# Patient Record
Sex: MALE | Race: WHITE | NOT HISPANIC OR LATINO | URBAN - METROPOLITAN AREA
[De-identification: names, ages, dates, MRNs, and addresses within clinical notes are randomized per-mention and may not be internally consistent; named-entity substitution may affect disease eponyms.]

---

## 2020-12-11 ENCOUNTER — HOSPITAL ENCOUNTER (EMERGENCY)
Facility: HOSPITAL | Age: 40
Discharge: HOME | End: 2020-12-11
Attending: EMERGENCY MEDICINE

## 2020-12-11 VITALS
DIASTOLIC BLOOD PRESSURE: 92 MMHG | SYSTOLIC BLOOD PRESSURE: 141 MMHG | TEMPERATURE: 98.6 F | WEIGHT: 175 LBS | BODY MASS INDEX: 25.05 KG/M2 | HEART RATE: 76 BPM | HEIGHT: 70 IN | RESPIRATION RATE: 18 BRPM | OXYGEN SATURATION: 100 %

## 2020-12-11 DIAGNOSIS — R20.2 PARESTHESIA OF UPPER AND LOWER EXTREMITIES OF BOTH SIDES: ICD-10-CM

## 2020-12-11 DIAGNOSIS — R42 INTERMITTENT LIGHTHEADEDNESS: Primary | ICD-10-CM

## 2020-12-11 LAB
ALBUMIN SERPL BCP-MCNC: 3.9 G/DL (ref 3.5–5)
ALP SERPL-CCNC: 93 U/L (ref 46–116)
ALT SERPL W P-5'-P-CCNC: 23 U/L (ref 12–78)
ANION GAP SERPL CALCULATED.3IONS-SCNC: 4 MMOL/L (ref 4–13)
AST SERPL W P-5'-P-CCNC: 14 U/L (ref 5–45)
ATRIAL RATE: 66 BPM
BILIRUB SERPL-MCNC: 0.18 MG/DL (ref 0.2–1)
BUN SERPL-MCNC: 17 MG/DL (ref 5–25)
CALCIUM SERPL-MCNC: 9.3 MG/DL (ref 8.3–10.1)
CHLORIDE SERPL-SCNC: 105 MMOL/L (ref 100–108)
CO2 SERPL-SCNC: 28 MMOL/L (ref 21–32)
CREAT SERPL-MCNC: 1.07 MG/DL (ref 0.6–1.3)
GFR SERPL CREATININE-BSD FRML MDRD: 86 ML/MIN/1.73SQ M
GLUCOSE SERPL-MCNC: 86 MG/DL (ref 65–140)
P AXIS: 20 DEGREES
POTASSIUM SERPL-SCNC: 4.2 MMOL/L (ref 3.5–5.3)
PR INTERVAL: 124 MS
PROT SERPL-MCNC: 7.5 G/DL (ref 6.4–8.2)
QRS AXIS: 86 DEGREES
QRSD INTERVAL: 98 MS
QT INTERVAL: 388 MS
QTC INTERVAL: 406 MS
SODIUM SERPL-SCNC: 137 MMOL/L (ref 136–145)
T WAVE AXIS: 24 DEGREES
VENTRICULAR RATE: 66 BPM

## 2020-12-11 PROCEDURE — 99285 EMERGENCY DEPT VISIT HI MDM: CPT | Performed by: EMERGENCY MEDICINE

## 2020-12-11 PROCEDURE — 93005 ELECTROCARDIOGRAM TRACING: CPT

## 2020-12-11 PROCEDURE — 80053 COMPREHEN METABOLIC PANEL: CPT | Performed by: EMERGENCY MEDICINE

## 2020-12-11 PROCEDURE — 99285 EMERGENCY DEPT VISIT HI MDM: CPT

## 2020-12-11 PROCEDURE — 93010 ELECTROCARDIOGRAM REPORT: CPT | Performed by: INTERNAL MEDICINE

## 2020-12-11 PROCEDURE — 36415 COLL VENOUS BLD VENIPUNCTURE: CPT | Performed by: EMERGENCY MEDICINE

## 2021-08-22 ENCOUNTER — HOSPITAL ENCOUNTER (EMERGENCY)
Facility: HOSPITAL | Age: 41
Discharge: HOME | End: 2021-08-23
Attending: EMERGENCY MEDICINE | Admitting: EMERGENCY MEDICINE
Payer: COMMERCIAL

## 2021-08-22 ENCOUNTER — APPOINTMENT (EMERGENCY)
Dept: RADIOLOGY | Facility: HOSPITAL | Age: 41
End: 2021-08-22
Payer: COMMERCIAL

## 2021-08-22 VITALS
SYSTOLIC BLOOD PRESSURE: 117 MMHG | TEMPERATURE: 98 F | OXYGEN SATURATION: 99 % | RESPIRATION RATE: 18 BRPM | HEART RATE: 96 BPM | HEIGHT: 70 IN | DIASTOLIC BLOOD PRESSURE: 91 MMHG | WEIGHT: 175 LBS | BODY MASS INDEX: 25.05 KG/M2

## 2021-08-22 DIAGNOSIS — L03.115 CELLULITIS OF RIGHT FOOT: Primary | ICD-10-CM

## 2021-08-22 PROCEDURE — 99283 EMERGENCY DEPT VISIT LOW MDM: CPT

## 2021-08-22 PROCEDURE — 73630 X-RAY EXAM OF FOOT: CPT

## 2021-08-22 PROCEDURE — 99284 EMERGENCY DEPT VISIT MOD MDM: CPT | Performed by: EMERGENCY MEDICINE

## 2021-08-22 RX ORDER — CEPHALEXIN 500 MG/1
500 CAPSULE ORAL EVERY 6 HOURS SCHEDULED
Qty: 20 CAPSULE | Refills: 0 | Status: SHIPPED | OUTPATIENT
Start: 2021-08-22 | End: 2021-08-27

## 2021-08-22 RX ORDER — CEPHALEXIN 250 MG/1
500 CAPSULE ORAL ONCE
Status: COMPLETED | OUTPATIENT
Start: 2021-08-23 | End: 2021-08-22

## 2021-08-22 RX ORDER — IBUPROFEN 600 MG/1
600 TABLET ORAL ONCE
Status: COMPLETED | OUTPATIENT
Start: 2021-08-22 | End: 2021-08-22

## 2021-08-22 RX ADMIN — IBUPROFEN 600 MG: 600 TABLET, FILM COATED ORAL at 23:04

## 2021-08-22 RX ADMIN — CEPHALEXIN 500 MG: 250 CAPSULE ORAL at 23:56

## 2021-08-22 NOTE — Clinical Note
Kylee Colvin was seen and treated in our emergency department on 8/22/2021  Weight bearing as tolerated    Diagnosis: Right foot cellulitis    Anitha Bergeron  is off the rest of the shift today, may return to work on return date  He may return on this date: 08/24/2021         If you have any questions or concerns, please don't hesitate to call        Martha Mak MD    ______________________________           _______________          _______________  [facility] [place]                              Date                                Time

## 2021-08-23 NOTE — ED PROVIDER NOTES
History  Chief Complaint   Patient presents with   [facility] Injury     Patient states he stepped on something with right foot on Thursday  Redness, swelling and puss-like drainage from the site  Increasing pain in right foot radiating up right leg  Denies any fevers/chills  37 y/o male presents to the ED for evaluation of right foot injury sustained 3 days ago  He states that he sustained an area of redness and irritation to the side of his foot and suspected that he either stepped on something or a rock was in his shoe  He noticed progressive redness, swelling, pain at this site and attempted to drain it using a "hot pin" last night  He is able to express purulent fluid, however today he has noticed increasing pain radiating up the right leg  No fevers, chills, cough, shortness of breath, chest pain, abdominal pain, nausea, vomiting, diarrhea, or urinary symptoms  Tetanus is up-to-date  None       History reviewed  No pertinent past medical history  History reviewed  No pertinent surgical history  History reviewed  No pertinent family history  I have reviewed and agree with the history as documented  E-Cigarette/Vaping    E-Cigarette Use Never User      E-Cigarette/Vaping Substances     Social History     Tobacco Use    Smoking status: Current Every Day Smoker     Packs/day: 1 00    Smokeless tobacco: Never Used   Vaping Use    Vaping Use: Never used   Substance Use Topics    Alcohol use: Yes     Comment: rare    Drug use: Never        Review of Systems   Constitutional: Negative for chills and fever  HENT: Negative for congestion, rhinorrhea and sore throat  Respiratory: Negative for cough and shortness of breath  Cardiovascular: Negative for chest pain and palpitations  Gastrointestinal: Negative for abdominal pain, diarrhea, nausea and vomiting  Genitourinary: Negative for dysuria and hematuria  Musculoskeletal: Negative for back pain and neck pain          Pain and swelling of the right foot   Skin:        Redness of the right foot   Neurological: Negative for weakness, light-headedness, numbness and headaches  All other systems reviewed and are negative  Physical Exam  ED Triage Vitals [08/22/21 2122]   Temperature Pulse Respirations Blood Pressure SpO2   98 °F (36 7 °C) 96 18 117/91 99 %      Temp Source Heart Rate Source Patient Position - Orthostatic VS BP Location FiO2 (%)   Oral Monitor Sitting Left arm --      Pain Score       6             Orthostatic Vital Signs  Vitals:    08/22/21 2122   BP: 117/91   Pulse: 96   Patient Position - Orthostatic VS: Sitting       Physical Exam  Vitals and nursing note reviewed  Constitutional:       General: He is not in acute distress  Appearance: Normal appearance  He is normal weight  HENT:      Head: Normocephalic and atraumatic  Right Ear: External ear normal       Left Ear: External ear normal       Nose: Nose normal       Mouth/Throat:      Mouth: Mucous membranes are moist       Pharynx: Oropharynx is clear  No oropharyngeal exudate or posterior oropharyngeal erythema  Eyes:      Extraocular Movements: Extraocular movements intact  Conjunctiva/sclera: Conjunctivae normal       Pupils: Pupils are equal, round, and reactive to light  Cardiovascular:      Rate and Rhythm: Normal rate and regular rhythm  Pulses: Normal pulses  Heart sounds: Normal heart sounds  Pulmonary:      Effort: Pulmonary effort is normal  No respiratory distress  Breath sounds: Normal breath sounds  No wheezing or rales  Abdominal:      General: Abdomen is flat  Bowel sounds are normal  There is no distension  Palpations: Abdomen is soft  Tenderness: There is no abdominal tenderness  There is no right CVA tenderness, left CVA tenderness or guarding  Musculoskeletal:         General: No swelling or tenderness  Normal range of motion  Cervical back: Normal range of motion and neck supple   No tenderness  Comments: There is a small area of callus on the right distal medial plantar aspect of the right foot with cerumen the erythema and warmth  No fluctuance or purulent material expressed from the site  Mild tenderness to palpation  Neurovascularly intact  Skin:     General: Skin is warm and dry  Comments: Erythema as noted above   Neurological:      General: No focal deficit present  Mental Status: He is alert and oriented to person, place, and time  Sensory: No sensory deficit  Motor: No weakness  ED Medications  Medications   ibuprofen (MOTRIN) tablet 600 mg (600 mg Oral Given 8/22/21 2304)   cephalexin (KEFLEX) capsule 500 mg (500 mg Oral Given 8/22/21 2356)       Diagnostic Studies  Results Reviewed     None                 XR foot 3+ views RIGHT   ED Interpretation by Errol Edward MD (08/22 2344)   No radiopaque foreign body  No acute osseous abnormality  Final Result by Anthony Lilly MD (08/23 1037)      No acute osseous abnormality  Unremarkable soft tissues  Workstation performed: [id number]               Procedures  Procedures      ED Course                                       MDM  Number of Diagnoses or Management Options  Cellulitis of right foot  Diagnosis management comments: 35 y/o male presents to the ED for evaluation of right foot injury sustained 3 days ago  He states that he sustained an area of redness and irritation to the side of his foot and suspected that he either stepped on something or a rock was in his shoe  He noticed progressive redness, swelling, pain at this site and attempted to drain it using a "hot pin" last night  He is able to express purulent fluid, however today he has noticed increasing pain radiating up the right leg  No fevers, chills, cough, shortness of breath, chest pain, abdominal pain, nausea, vomiting, diarrhea, or urinary symptoms  Tetanus is up-to-date      Afebrile, VSS, appears slightly uncomfortable but no acute distress  Heart regular rate rhythm, lungs clear to auscultation bilaterally, abdomen benign  There is a small area of callus on the right distal medial plantar aspect of the right foot with cerumen the erythema and warmth  No fluctuance or purulent material expressed from the site  Likely cellulitis, will treat with Keflex  Due to concern for foreign body, will obtain x-rays of the right foot  X-rays reveal no foreign body  Discussed all findings, treatment, red flags/return precautions, and outpatient follow-up and the patient/family understands and agrees  Stable for discharge  Disposition  Final diagnoses:   Cellulitis of right foot     Time reflects when diagnosis was documented in both MDM as applicable and the Disposition within this note     Time User Action Codes Description Comment    8/22/2021 11:44 PM Yoandy Walker [[id number]] Cellulitis of right foot       ED Disposition     ED Disposition Condition Date/Time Comment    Discharge Stable Sun Aug 22, 2021 11:44 PM Bryce Stevenson discharge to home/self care              Follow-up Information     Follow up With Specialties Details Why Contact Info Additional [address] [facility] DO [place] [facility] Call in 1 day As needed for follow-up [address] [place] [address] [facility] Pod[facility]ry Call in 1 day As needed for follow-up [address] [id number]  [address] [facility], [address] [place], [place], [address] [facility]    [address] [place] [address] 34 [facility] Emergency Department Emergency Medicine Go to  If symptoms worsen [address]  [address] [facility] 64 [facility] Emergency Department, [address], [address]   [phone]          Discharge Medication List as of 8/22/2021 11:54 PM      START taking these medications    Details   cephalexin (KEFLEX) 500 mg capsule Take 1 capsule (500 mg total) by mouth every 6 (six) hours for 5 days, Starting Sun 8/22/2021, Until Fri 8/27/2021, Normal           No discharge procedures on file  PDMP Review     None           ED Provider  Attending physically available and evaluated Andrew Escobar I managed the patient along with the ED Attending      Electronically Signed by         Darius Baum MD  09/03/21 9716

## 2021-08-23 NOTE — ED ATTENDING ATTESTATION
8/22/2021  I, Antonino Enrique MD, saw and evaluated the patient  I have discussed the patient with the resident/non-physician practitioner and agree with the resident's/non-physician practitioner's findings, Plan of Care, and MDM as documented in the resident's/non-physician practitioner's note, except where noted  All available labs and Radiology studies were reviewed  I was present for key portions of any procedure(s) performed by the resident/non-physician practitioner and I was immediately available to provide assistance  At this point I agree with the current assessment done in the Emergency Department  I have conducted an independent evaluation of this patient a history and physical is as follows:    ED Course   36 M with right foot pain onset of sx was Thursday - unclear if patint stepped on something  But developed pain redness and swelling to lateral aspect of right foot  Patinet attempted to express purulent material  With some mild relief of sx but redness persisted  VS reviewed      Exam of right foot - limb is warm and well perfused  Small LT 0 5 cm puncture wound with callous and surrounding erythema  No fluctuance no purulent material expressed from site  Impression right foot puncture wound with cellulitis  No H/o DM  patient does not recall direct injury - will check x ray to exclude FB  Abx and D/C  Follow up with PCP and Podiatry            Critical Care Time  Procedures

## 2022-11-05 ENCOUNTER — APPOINTMENT (EMERGENCY)
Dept: RADIOLOGY | Facility: HOSPITAL | Age: 42
End: 2022-11-05

## 2022-11-05 ENCOUNTER — APPOINTMENT (EMERGENCY)
Dept: CT IMAGING | Facility: HOSPITAL | Age: 42
End: 2022-11-05

## 2022-11-05 ENCOUNTER — HOSPITAL ENCOUNTER (OUTPATIENT)
Facility: HOSPITAL | Age: 42
Setting detail: OBSERVATION
Discharge: HOME | End: 2022-11-06
Attending: EMERGENCY MEDICINE | Admitting: HOSPITALIST

## 2022-11-05 DIAGNOSIS — R42 VERTIGO: Primary | ICD-10-CM

## 2022-11-05 DIAGNOSIS — R42 DIZZINESS: ICD-10-CM

## 2022-11-05 PROBLEM — F17.200 NICOTINE DEPENDENCE: Status: ACTIVE | Noted: 2022-11-05

## 2022-11-05 PROBLEM — T50.8X5A ALLERGIC REACTION TO CONTRAST DYE: Status: ACTIVE | Noted: 2022-11-05

## 2022-11-05 PROBLEM — U07.1 COVID-19: Status: ACTIVE | Noted: 2022-11-05

## 2022-11-05 LAB
2HR DELTA HS TROPONIN: 0 NG/L
4HR DELTA HS TROPONIN: 0 NG/L
ALBUMIN SERPL BCP-MCNC: 4.2 G/DL (ref 3.5–5)
ALP SERPL-CCNC: 80 U/L (ref 34–104)
ALT SERPL W P-5'-P-CCNC: 14 U/L (ref 7–52)
ANION GAP SERPL CALCULATED.3IONS-SCNC: 7 MMOL/L (ref 4–13)
APTT PPP: 33 SECONDS (ref 23–37)
AST SERPL W P-5'-P-CCNC: 12 U/L (ref 13–39)
BASOPHILS # BLD AUTO: 0.05 THOUSANDS/ÂΜL (ref 0–0.1)
BASOPHILS NFR BLD AUTO: 1 % (ref 0–1)
BILIRUB SERPL-MCNC: 0.28 MG/DL (ref 0.2–1)
BUN SERPL-MCNC: 16 MG/DL (ref 5–25)
CALCIUM SERPL-MCNC: 9.1 MG/DL (ref 8.4–10.2)
CARDIAC TROPONIN I PNL SERPL HS: 2 NG/L
CHLORIDE SERPL-SCNC: 105 MMOL/L (ref 96–108)
CO2 SERPL-SCNC: 28 MMOL/L (ref 21–32)
CREAT SERPL-MCNC: 0.84 MG/DL (ref 0.6–1.3)
EOSINOPHIL # BLD AUTO: 0.78 THOUSAND/ÂΜL (ref 0–0.61)
EOSINOPHIL NFR BLD AUTO: 11 % (ref 0–6)
ERYTHROCYTE [DISTWIDTH] IN BLOOD BY AUTOMATED COUNT: 13.6 % (ref 11.6–15.1)
FLUAV RNA RESP QL NAA+PROBE: NEGATIVE
FLUBV RNA RESP QL NAA+PROBE: NEGATIVE
GFR SERPL CREATININE-BSD FRML MDRD: 107 ML/MIN/1.73SQ M
GLUCOSE SERPL-MCNC: 89 MG/DL (ref 65–140)
HCT VFR BLD AUTO: 43.8 % (ref 36.5–49.3)
HGB BLD-MCNC: 14.8 G/DL (ref 12–17)
IMM GRANULOCYTES # BLD AUTO: 0.01 THOUSAND/UL (ref 0–0.2)
IMM GRANULOCYTES NFR BLD AUTO: 0 % (ref 0–2)
INR PPP: 0.83 (ref 0.84–1.19)
LYMPHOCYTES # BLD AUTO: 2.2 THOUSANDS/ÂΜL (ref 0.6–4.47)
LYMPHOCYTES NFR BLD AUTO: 30 % (ref 14–44)
MCH RBC QN AUTO: 29.4 PG (ref 26.8–34.3)
MCHC RBC AUTO-ENTMCNC: 33.8 G/DL (ref 31.4–37.4)
MCV RBC AUTO: 87 FL (ref 82–98)
MONOCYTES # BLD AUTO: 0.57 THOUSAND/ÂΜL (ref 0.17–1.22)
MONOCYTES NFR BLD AUTO: 8 % (ref 4–12)
NEUTROPHILS # BLD AUTO: 3.71 THOUSANDS/ÂΜL (ref 1.85–7.62)
NEUTS SEG NFR BLD AUTO: 50 % (ref 43–75)
NRBC BLD AUTO-RTO: 0 /100 WBCS
PLATELET # BLD AUTO: 234 THOUSANDS/UL (ref 149–390)
PMV BLD AUTO: 10.8 FL (ref 8.9–12.7)
POTASSIUM SERPL-SCNC: 3.9 MMOL/L (ref 3.5–5.3)
PROT SERPL-MCNC: 6.7 G/DL (ref 6.4–8.4)
PROTHROMBIN TIME: 11.6 SECONDS (ref 11.6–14.5)
RBC # BLD AUTO: 5.04 MILLION/UL (ref 3.88–5.62)
RSV RNA RESP QL NAA+PROBE: NEGATIVE
SARS-COV-2 RNA RESP QL NAA+PROBE: NEGATIVE
SODIUM SERPL-SCNC: 140 MMOL/L (ref 135–147)
TSH SERPL DL<=0.05 MIU/L-ACNC: 0.95 UIU/ML (ref 0.45–4.5)
WBC # BLD AUTO: 7.32 THOUSAND/UL (ref 4.31–10.16)

## 2022-11-05 RX ORDER — ATORVASTATIN CALCIUM 40 MG/1
40 TABLET, FILM COATED ORAL
Status: DISCONTINUED | OUTPATIENT
Start: 2022-11-06 | End: 2022-11-06 | Stop reason: HOSPADM

## 2022-11-05 RX ORDER — MECLIZINE HYDROCHLORIDE 25 MG/1
25 TABLET ORAL EVERY 8 HOURS PRN
Status: DISCONTINUED | OUTPATIENT
Start: 2022-11-05 | End: 2022-11-06 | Stop reason: HOSPADM

## 2022-11-05 RX ORDER — NICOTINE 21 MG/24HR
1 PATCH, TRANSDERMAL 24 HOURS TRANSDERMAL DAILY
Status: DISCONTINUED | OUTPATIENT
Start: 2022-11-05 | End: 2022-11-06 | Stop reason: HOSPADM

## 2022-11-05 RX ORDER — METHYLPREDNISOLONE SODIUM SUCCINATE 125 MG/2ML
125 INJECTION, POWDER, LYOPHILIZED, FOR SOLUTION INTRAMUSCULAR; INTRAVENOUS ONCE
Status: COMPLETED | OUTPATIENT
Start: 2022-11-05 | End: 2022-11-05

## 2022-11-05 RX ORDER — ONDANSETRON 2 MG/ML
4 INJECTION INTRAMUSCULAR; INTRAVENOUS EVERY 6 HOURS PRN
Status: DISCONTINUED | OUTPATIENT
Start: 2022-11-05 | End: 2022-11-06 | Stop reason: HOSPADM

## 2022-11-05 RX ORDER — MECLIZINE HCL 12.5 MG/1
25 TABLET ORAL ONCE
Status: COMPLETED | OUTPATIENT
Start: 2022-11-05 | End: 2022-11-05

## 2022-11-05 RX ORDER — ASPIRIN 81 MG/1
81 TABLET, CHEWABLE ORAL DAILY
Status: DISCONTINUED | OUTPATIENT
Start: 2022-11-05 | End: 2022-11-06 | Stop reason: HOSPADM

## 2022-11-05 RX ORDER — DIPHENHYDRAMINE HYDROCHLORIDE 50 MG/ML
50 INJECTION INTRAMUSCULAR; INTRAVENOUS ONCE
Status: COMPLETED | OUTPATIENT
Start: 2022-11-05 | End: 2022-11-05

## 2022-11-05 RX ORDER — ASPIRIN 81 MG/1
81 TABLET, CHEWABLE ORAL DAILY
Status: DISCONTINUED | OUTPATIENT
Start: 2022-11-06 | End: 2022-11-05

## 2022-11-05 RX ORDER — ACETAMINOPHEN 325 MG/1
650 TABLET ORAL EVERY 6 HOURS PRN
Status: DISCONTINUED | OUTPATIENT
Start: 2022-11-05 | End: 2022-11-06 | Stop reason: HOSPADM

## 2022-11-05 RX ADMIN — MECLIZINE 25 MG: 12.5 TABLET ORAL at 17:37

## 2022-11-05 RX ADMIN — IOHEXOL 85 ML: 350 INJECTION, SOLUTION INTRAVENOUS at 19:16

## 2022-11-05 RX ADMIN — METHYLPREDNISOLONE SODIUM SUCCINATE 125 MG: 125 INJECTION, POWDER, FOR SOLUTION INTRAMUSCULAR; INTRAVENOUS at 19:28

## 2022-11-05 RX ADMIN — DIPHENHYDRAMINE HYDROCHLORIDE 50 MG: 50 INJECTION, SOLUTION INTRAMUSCULAR; INTRAVENOUS at 19:27

## 2022-11-05 RX ADMIN — ASPIRIN 81 MG CHEWABLE TABLET 81 MG: 81 TABLET CHEWABLE at 23:44

## 2022-11-05 RX ADMIN — NICOTINE 1 PATCH: 21 PATCH, EXTENDED RELEASE TRANSDERMAL at 23:45

## 2022-11-05 NOTE — ED PROVIDER NOTES
History  Chief Complaint   Patient presents with   • Syncope     Patient reports having periods of dizziness, "rocking on a boat sensation", off balance, near syncope x2  Reports fall and lose of balance this AM, no injury/headstrike  (+) ASA  35yo M presenting for dizziness  For the past 2d, Pt has been having worsening dizziness in which he describes as of he was on a rocking boat  Finds it worse with movement  Earlier today, he got so dizzy that he fell  Of note, for the past couple of months, he had flank pain with deep inhalation, SOB, palpitations, coughing up black sputum  Endorses chronic tobacco use, nausea, FHx of virtigo, numbness in his fingers    Hasn't seen a PCP in years  Denies head strike, LOC, confusion, hematuria, dysuria, F/C, h/o kidney stones, changes in BM, recent travel/hospitalizations, DVT hx, leg swelling/pain, CP, neck pain, recent trauma, HA, tinnitus/hearing changes, abd  Pain  History provided by:  Patient      None       History reviewed  No pertinent past medical history  History reviewed  No pertinent surgical history  History reviewed  No pertinent family history  I have reviewed and agree with the history as documented  E-Cigarette/Vaping   • E-Cigarette Use Never User      E-Cigarette/Vaping Substances     Social History     Tobacco Use   • Smoking status: Current Every Day Smoker     Packs/day: 1 00   • Smokeless tobacco: Never Used   Vaping Use   • Vaping Use: Never used   Substance Use Topics   • Alcohol use: Yes     Comment: rare   • Drug use: Never        Review of Systems   Constitutional: Negative  HENT: Negative  Eyes: Positive for visual disturbance  Respiratory: Positive for cough and shortness of breath  Cardiovascular: Positive for palpitations  Negative for chest pain and leg swelling  Gastrointestinal: Positive for nausea  Negative for abdominal pain, constipation, diarrhea and vomiting  Genitourinary: Positive for flank pain  Negative for dysuria, hematuria and urgency  Musculoskeletal: Positive for gait problem  Negative for neck pain and neck stiffness  Skin: Negative  Neurological: Positive for dizziness and numbness  Negative for speech difficulty, weakness and headaches  Psychiatric/Behavioral: Negative  Physical Exam  ED Triage Vitals   Temperature Pulse Respirations Blood Pressure SpO2   11/05/22 1631 11/05/22 1631 11/05/22 1631 11/05/22 1631 11/05/22 1631   98 5 °F (36 9 °C) 76 16 170/90 100 %      Temp Source Heart Rate Source Patient Position - Orthostatic VS BP Location FiO2 (%)   11/05/22 1631 11/05/22 1631 -- 11/05/22 1631 --   Oral Monitor  Right arm       Pain Score       11/05/22 1933       No Pain             Orthostatic Vital Signs  Vitals:    11/05/22 1631 11/05/22 1933   BP: 170/90 141/88   Pulse: 76 66       Physical Exam  Constitutional:       General: He is not in acute distress  Appearance: Normal appearance  HENT:      Head: Normocephalic and atraumatic  Right Ear: External ear normal       Left Ear: External ear normal       Nose: Nose normal  No rhinorrhea  Mouth/Throat:      Mouth: Mucous membranes are moist       Pharynx: Oropharynx is clear  Eyes:      Extraocular Movements: Extraocular movements intact  Conjunctiva/sclera: Conjunctivae normal       Pupils: Pupils are equal, round, and reactive to light  Cardiovascular:      Rate and Rhythm: Normal rate and regular rhythm  Pulses: Normal pulses  Heart sounds: Normal heart sounds  Pulmonary:      Effort: Pulmonary effort is normal       Breath sounds: Normal breath sounds  Abdominal:      General: Abdomen is flat  Palpations: Abdomen is soft  Tenderness: There is no right CVA tenderness or left CVA tenderness  Musculoskeletal:         General: No tenderness  Cervical back: Normal range of motion and neck supple  Right lower leg: No edema  Left lower leg: No edema     Skin: General: Skin is warm and dry  Capillary Refill: Capillary refill takes less than 2 seconds  Neurological:      General: No focal deficit present  Mental Status: He is alert and oriented to person, place, and time  Cranial Nerves: No cranial nerve deficit  Sensory: No sensory deficit  Motor: No weakness  Coordination: Coordination normal       Gait: Gait abnormal (Romberg positive)  Psychiatric:         Mood and Affect: Mood normal          Behavior: Behavior normal          ED Medications  Medications   meclizine (ANTIVERT) tablet 25 mg (25 mg Oral Given 11/5/22 1737)   iohexol (OMNIPAQUE) 350 MG/ML injection (SINGLE-DOSE) 85 mL (85 mL Intravenous Given 11/5/22 1916)   methylPREDNISolone sodium succinate (Solu-MEDROL) injection 125 mg (125 mg Intravenous Given 11/5/22 1928)   diphenhydrAMINE (BENADRYL) injection 50 mg (50 mg Intravenous Given 11/5/22 1927)       Diagnostic Studies  Results Reviewed     Procedure Component Value Units Date/Time    COVID/FLU/RSV [[id number]]     Lab Status: No result Specimen: Nares from Nose     HS Troponin I 2hr [[id number]]  (Normal) Collected: 11/05/22 1957    Lab Status: Final result Specimen: Blood from Arm, Right Updated: 11/05/22 2032     hs TnI 2hr 2 ng/L      Delta 2hr hsTnI 0 ng/L     HS Troponin I 4hr [[id number]]     Lab Status: No result Specimen: Blood     TSH [[id number]]  (Normal) Collected: 11/05/22 1737    Lab Status: Final result Specimen: Blood from Arm, Right Updated: 11/05/22 1834     TSH 3RD GENERATON 0 953 uIU/mL     Narrative:      Patients undergoing fluorescein dye angiography may retain small amounts of fluorescein in the body for 48-72 hours post procedure  Samples containing fluorescein can produce falsely depressed TSH values  If the patient had this procedure,a specimen should be resubmitted post fluorescein clearance        HS Troponin 0hr (reflex protocol) [[id number]]  (Normal) Collected: 11/05/22 1737    Lab Status: Final result Specimen: Blood from Arm, Right Updated: 11/05/22 1827     hs TnI 0hr 2 ng/L     Comprehensive metabolic panel [[id number]]  (Abnormal) Collected: 11/05/22 1737    Lab Status: Final result Specimen: Blood from Arm, Right Updated: 11/05/22 1822     Sodium 140 mmol/L      Potassium 3 9 mmol/L      Chloride 105 mmol/L      CO2 28 mmol/L      ANION GAP 7 mmol/L      BUN 16 mg/dL      Creatinine 0 84 mg/dL      Glucose 89 mg/dL      Calcium 9 1 mg/dL      AST 12 U/L      ALT 14 U/L      Alkaline Phosphatase 80 U/L      Total Protein 6 7 g/dL      Albumin 4 2 g/dL      Total Bilirubin 0 28 mg/dL      eGFR 107 ml/min/1 73sq m     Narrative:      [place] guidelines for Chronic Kidney Disease (CKD):   •  Stage 1 with normal or high GFR (GFR > 90 mL/min/1 73 square meters)  •  Stage 2 Mild CKD (GFR = 60-89 mL/min/1 73 square meters)  •  Stage 3A Moderate CKD (GFR = 45-59 mL/min/1 73 square meters)  •  Stage 3B Moderate CKD (GFR = 30-44 mL/min/1 73 square meters)  •  Stage 4 Severe CKD (GFR = 15-29 mL/min/1 73 square meters)  •  Stage 5 End Stage CKD (GFR <15 mL/min/1 73 square meters)  Note: GFR calculation is accurate only with a steady state creatinine    Protime-INR [[id number]]  (Abnormal) Collected: 11/05/22 1737    Lab Status: Final result Specimen: Blood from Arm, Right Updated: 11/05/22 1820     Protime 11 6 seconds      INR 0 83    APTT [[id number]]  (Normal) Collected: 11/05/22 1737    Lab Status: Final result Specimen: Blood from Arm, Right Updated: 11/05/22 1820     PTT 33 seconds     CBC and differential [[id number]]  (Abnormal) Collected: 11/05/22 1737    Lab Status: Final result Specimen: Blood from Arm, Right Updated: 11/05/22 1756     WBC 7 32 Thousand/uL      RBC 5 04 Million/uL      Hemoglobin 14 8 g/dL      Hematocrit 43 8 %      MCV 87 fL      MCH 29 4 pg      MCHC 33 8 g/dL      RDW 13 6 %      MPV 10 8 fL      Platelets 696 Thousands/uL      nRBC 0 /100 WBCs Neutrophils Relative 50 %      Immat GRANS % 0 %      Lymphocytes Relative 30 %      Monocytes Relative 8 %      Eosinophils Relative 11 %      Basophils Relative 1 %      Neutrophils Absolute 3 71 Thousands/µL      Immature Grans Absolute 0 01 Thousand/uL      Lymphocytes Absolute 2 20 Thousands/µL      Monocytes Absolute 0 57 Thousand/µL      Eosinophils Absolute 0 78 Thousand/µL      Basophils Absolute 0 05 Thousands/µL                  CTA head and neck with and without contrast   Final Result by Ernst Terrazas MD (11/05 1947)   1  CT brain:  No acute intracranial abnormality   2  CTA head: Negative for large vessel intracranial occlusion or hemodynamically significant stenosis  Focal tortuosity of the left cavernous ICA segment likely accounting for the lateral protuberance  3  CTA neck:  No extracranial carotid stenosis  The cervical vertebral arteries are patent  Workstation performed: [id number]         XR chest 1 view portable   ED Interpretation by Margaret Anderson MD (11/05 1751)   No evidence of cardiopulmonary pathology  Procedures  ECG 12 Lead Documentation Only    Date/Time: 11/5/2022 5:36 PM  Performed by: Margaret Anderson MD  Authorized by: Margaret Anderson MD     ECG reviewed by me, the ED Provider: yes    Patient location:  ED  Interpretation:     Interpretation: normal    Rate:     ECG rate:  69    ECG rate assessment: normal    Rhythm:     Rhythm: sinus rhythm    Ectopy:     Ectopy: none    QRS:     QRS axis:  Normal    QRS intervals: Wide  Conduction:     Conduction: normal    ST segments:     ST segments:  Normal  T waves:     T waves: normal            ED Course                             SBIRT 22yo+    Flowsheet Row Most Recent Value   SBIRT (25 yo +)    In order to provide better care to our patients, we are screening all of our patients for alcohol and drug use  Would it be okay to ask you these screening questions?  Unable to answer at this time Filed at: 11/05/2022 4963 [facility]  Number of Diagnoses or Management Options  Vertigo  Diagnosis management comments:  Pt with vertigo at rest, postural instability, and falling  Did not respond to meclizine  Labs and imagine unremarkable  Pt had a reaction to the IV contrast, for which he received benadryl and steroids  He responded well without further complications  Will require admission for further workup and symptom management  Disposition  Final diagnoses:   Vertigo     Time reflects when diagnosis was documented in both MDM as applicable and the Disposition within this note     Time User Action Codes Description Comment    11/5/2022  8:48 PM Ronnie [facility] Add [R42] Vertigo       ED Disposition     ED Disposition   Admit    Condition   Stable    Date/Time   Sat Nov 5, 2022  8:48 PM    Comment   Case was discussed with SIMONE and the patient's admission status was agreed to be Admission Status: observation status to the service of Dr Watts Blood   Follow-up Information    None         Patient's Medications    No medications on file     No discharge procedures on file  PDMP Review     None           ED Provider  Attending physically available and evaluated Enrique Walters I managed the patient along with the ED Attending      Electronically Signed by         Valorie Oliva MD  11/05/22 2053

## 2022-11-05 NOTE — ED NOTES
Provider Donita Dubin MD at bedside evaluating patient  New orders placed        Dinorah Holm, RN  11/05/22 1871

## 2022-11-05 NOTE — ED NOTES
Patient returned from CT reporting itching and hives beginning on chest  Patient states itching is around face, neck, ears and throat  Airway intact  Provider C Sheryle Odor MD made aware  RN at bedside awaiting orders        Bennie Bonilla RN  11/05/22 3080

## 2022-11-06 ENCOUNTER — APPOINTMENT (OUTPATIENT)
Dept: NON INVASIVE DIAGNOSTICS | Facility: HOSPITAL | Age: 42
End: 2022-11-06

## 2022-11-06 VITALS
HEIGHT: 70 IN | WEIGHT: 175 LBS | RESPIRATION RATE: 16 BRPM | TEMPERATURE: 97.9 F | HEART RATE: 84 BPM | DIASTOLIC BLOOD PRESSURE: 89 MMHG | OXYGEN SATURATION: 97 % | BODY MASS INDEX: 25.05 KG/M2 | SYSTOLIC BLOOD PRESSURE: 127 MMHG

## 2022-11-06 PROBLEM — R42 DIZZINESS: Status: RESOLVED | Noted: 2022-11-05 | Resolved: 2022-11-06

## 2022-11-06 LAB
ANION GAP SERPL CALCULATED.3IONS-SCNC: 8 MMOL/L (ref 4–13)
AORTIC ROOT: 4.1 CM
AORTIC VALVE MEAN VELOCITY: 7.3 M/S
APICAL FOUR CHAMBER EJECTION FRACTION: 68 %
ASCENDING AORTA: 3.1 CM
ATRIAL RATE: 69 BPM
ATRIAL RATE: 76 BPM
AV AREA BY CONTINUOUS VTI: 3.8 CM2
AV AREA PEAK VELOCITY: 3.7 CM2
AV LVOT MEAN GRADIENT: 2 MMHG
AV LVOT PEAK GRADIENT: 4 MMHG
AV MEAN GRADIENT: 2 MMHG
AV PEAK GRADIENT: 4 MMHG
AV VALVE AREA: 3.84 CM2
AV VELOCITY RATIO: 0.97
BUN SERPL-MCNC: 15 MG/DL (ref 5–25)
CALCIUM SERPL-MCNC: 9.5 MG/DL (ref 8.4–10.2)
CHLORIDE SERPL-SCNC: 107 MMOL/L (ref 96–108)
CHOLEST SERPL-MCNC: 267 MG/DL
CO2 SERPL-SCNC: 24 MMOL/L (ref 21–32)
CREAT SERPL-MCNC: 0.79 MG/DL (ref 0.6–1.3)
DOP CALC AO PEAK VEL: 1.01 M/S
DOP CALC AO VTI: 19.05 CM
DOP CALC LVOT AREA: 3.8 CM2
DOP CALC LVOT DIAMETER: 2.2 CM
DOP CALC LVOT PEAK VEL VTI: 19.26 CM
DOP CALC LVOT PEAK VEL: 0.98 M/S
DOP CALC LVOT STROKE INDEX: 38.1 ML/M2
DOP CALC LVOT STROKE VOLUME: 73.18 CM3
E WAVE DECELERATION TIME: 264 MS
ERYTHROCYTE [DISTWIDTH] IN BLOOD BY AUTOMATED COUNT: 13.4 % (ref 11.6–15.1)
EST. AVERAGE GLUCOSE BLD GHB EST-MCNC: 100 MG/DL
FRACTIONAL SHORTENING: 43 % (ref 28–44)
GFR SERPL CREATININE-BSD FRML MDRD: 110 ML/MIN/1.73SQ M
GLUCOSE SERPL-MCNC: 146 MG/DL (ref 65–140)
HBA1C MFR BLD: 5.1 %
HCT VFR BLD AUTO: 45.5 % (ref 36.5–49.3)
HDLC SERPL-MCNC: 62 MG/DL
HGB BLD-MCNC: 15.3 G/DL (ref 12–17)
INTERVENTRICULAR SEPTUM IN DIASTOLE (PARASTERNAL SHORT AXIS VIEW): 1.1 CM
INTERVENTRICULAR SEPTUM: 1.1 CM (ref 0.6–1.1)
LAAS-AP2: 15.9 CM2
LAAS-AP4: 14.4 CM2
LDLC SERPL CALC-MCNC: 177 MG/DL (ref 0–100)
LEFT ATRIUM SIZE: 3.6 CM
LEFT INTERNAL DIMENSION IN SYSTOLE: 2.8 CM (ref 2.1–4)
LEFT VENTRICULAR INTERNAL DIMENSION IN DIASTOLE: 4.9 CM (ref 3.5–6)
LEFT VENTRICULAR POSTERIOR WALL IN END DIASTOLE: 1.2 CM
LEFT VENTRICULAR STROKE VOLUME: 82 ML
LVSV (TEICH): 82 ML
MCH RBC QN AUTO: 29 PG (ref 26.8–34.3)
MCHC RBC AUTO-ENTMCNC: 33.6 G/DL (ref 31.4–37.4)
MCV RBC AUTO: 86 FL (ref 82–98)
MV E'TISSUE VEL-LAT: 13 CM/S
MV E'TISSUE VEL-SEP: 9 CM/S
MV PEAK A VEL: 0.65 M/S
MV PEAK E VEL: 63 CM/S
P AXIS: 35 DEGREES
P AXIS: 70 DEGREES
PLATELET # BLD AUTO: 221 THOUSANDS/UL (ref 149–390)
PMV BLD AUTO: 10.4 FL (ref 8.9–12.7)
POTASSIUM SERPL-SCNC: 4.1 MMOL/L (ref 3.5–5.3)
PR INTERVAL: 134 MS
PR INTERVAL: 154 MS
QRS AXIS: 70 DEGREES
QRS AXIS: 75 DEGREES
QRSD INTERVAL: 100 MS
QRSD INTERVAL: 106 MS
QT INTERVAL: 378 MS
QT INTERVAL: 392 MS
QTC INTERVAL: 420 MS
QTC INTERVAL: 425 MS
RBC # BLD AUTO: 5.28 MILLION/UL (ref 3.88–5.62)
RIGHT ATRIAL 2D VOLUME: 35 ML
RIGHT ATRIUM AREA SYSTOLE A4C: 14.1 CM2
RIGHT VENTRICLE ID DIMENSION: 3.3 CM
SL CV LEFT ATRIUM LENGTH A2C: 5 CM
SL CV LV EF: 65
SL CV PED ECHO LEFT VENTRICLE DIASTOLIC VOLUME (MOD BIPLANE) 2D: 112 ML
SL CV PED ECHO LEFT VENTRICLE SYSTOLIC VOLUME (MOD BIPLANE) 2D: 30 ML
SODIUM SERPL-SCNC: 139 MMOL/L (ref 135–147)
T WAVE AXIS: 57 DEGREES
T WAVE AXIS: 59 DEGREES
TRIGL SERPL-MCNC: 142 MG/DL
VENTRICULAR RATE: 69 BPM
VENTRICULAR RATE: 76 BPM
WBC # BLD AUTO: 6.64 THOUSAND/UL (ref 4.31–10.16)

## 2022-11-06 RX ORDER — MECLIZINE HYDROCHLORIDE 25 MG/1
25 TABLET ORAL EVERY 8 HOURS PRN
Qty: 15 TABLET | Refills: 0 | Status: SHIPPED | OUTPATIENT
Start: 2022-11-06

## 2022-11-06 RX ADMIN — MECLIZINE HYDROCHLORIDE 25 MG: 25 TABLET ORAL at 00:14

## 2022-11-06 RX ADMIN — ASPIRIN 81 MG CHEWABLE TABLET 81 MG: 81 TABLET CHEWABLE at 08:20

## 2022-11-06 NOTE — PLAN OF CARE
Problem: PAIN - ADULT  Goal: Verbalizes/displays adequate comfort level or baseline comfort level  Description: Interventions:  - Encourage patient to monitor pain and request assistance  - Assess pain using appropriate pain scale  - Administer analgesics based on type and severity of pain and evaluate response  - Implement non-pharmacological measures as appropriate and evaluate response  - Consider cultural and social influences on pain and pain management  - Notify physician/advanced practitioner if interventions unsuccessful or patient reports new pain  Outcome: Progressing     Problem: INFECTION - ADULT  Goal: Absence or prevention of progression during hospitalization  Description: INTERVENTIONS:  - Assess and monitor for signs and symptoms of infection  - Monitor lab/diagnostic results  - Monitor all insertion sites, i e  indwelling lines, tubes, and drains  - Monitor endotracheal if appropriate and nasal secretions for changes in amount and color  - [place] appropriate cooling/warming therapies per order  - Administer medications as ordered  - Instruct and encourage patient and family to use good hand hygiene technique  - Identify and instruct in appropriate isolation precautions for identified infection/condition  Outcome: Progressing  Goal: Absence of fever/infection during neutropenic period  Description: INTERVENTIONS:  - Monitor WBC    Outcome: Progressing     Problem: SAFETY ADULT  Goal: Patient will remain free of falls  Description: INTERVENTIONS:  - Educate patient/family on patient safety including physical limitations  - Instruct patient to call for assistance with activity   - Consult OT/PT to assist with strengthening/mobility   - Keep Call bell within reach  - Keep bed low and locked with side rails adjusted as appropriate  - Keep care items and personal belongings within reach  - Initiate and maintain comfort rounds  - Make Fall Risk Sign visible to staff  - Offer Toileting every 2 Hours, in advance of need  - Initiate/Maintain alarm  - Obtain necessary fall risk management equipment:   - Apply yellow socks and bracelet for high fall risk patients  - Consider moving patient to room near nurses station  Outcome: Progressing  Goal: Maintain or return to baseline ADL function  Description: INTERVENTIONS:  -  Assess patient's ability to carry out ADLs; assess patient's baseline for ADL function and identify physical deficits which impact ability to perform ADLs (bathing, care of mouth/teeth, toileting, grooming, dressing, etc )  - Assess/evaluate cause of self-care deficits   - Assess range of motion  - Assess patient's mobility; develop plan if impaired  - Assess patient's need for assistive devices and provide as appropriate  - Encourage maximum independence but intervene and supervise when necessary  - Involve family in performance of ADLs  - Assess for home care needs following discharge   - Consider OT consult to assist with ADL evaluation and planning for discharge  - Provide patient education as appropriate  Outcome: Progressing  Goal: Maintains/Returns to pre admission functional level  Description: INTERVENTIONS:  - Perform BMAT or MOVE assessment daily    - Set and communicate daily mobility goal to care team and patient/family/caregiver  - Collaborate with rehabilitation services on mobility goals if consulted  - Perform Range of Motion 2 times a day  - Reposition patient every 2 hours    - Dangle patient 2 times a day  - Stand patient 2 times a day  - Ambulate patient 2 times a day  - Out of bed to chair 2 times a day   - Out of bed for meals 2 times a day  - Out of bed for toileting  - Record patient progress and toleration of activity level   Outcome: Progressing     Problem: DISCHARGE PLANNING  Goal: Discharge to home or other facility with appropriate resources  Description: INTERVENTIONS:  - Identify barriers to discharge w/patient and caregiver  - Arrange for needed discharge resources and transportation as appropriate  - Identify discharge learning needs (meds, wound care, etc )  - Arrange for interpretive services to assist at discharge as needed  - Refer to Case Management Department for coordinating discharge planning if the patient needs post-hospital services based on physician/advanced practitioner order or complex needs related to functional status, cognitive ability, or social support system  Outcome: Progressing     Problem: Knowledge Deficit  Goal: Patient/family/caregiver demonstrates understanding of disease process, treatment plan, medications, and discharge instructions  Description: Complete learning assessment and assess knowledge base    Interventions:  - Provide teaching at level of understanding  - Provide teaching via preferred learning methods  Outcome: Progressing

## 2022-11-06 NOTE — UTILIZATION REVIEW
Initial Clinical Review    Admission: Date/Time/Statement:   Admission Orders (From admission, onward)     Ordered        11/05/22 2049  Place in Observation  Once                      Orders Placed This Encounter   Procedures   • Place in Observation     Standing Status:   Standing     Number of Occurrences:   1     Order Specific Question:   Level of Care     Answer:   Med Surg [16]     ED Arrival Information     Expected   -    Arrival   11/5/2022 16:26    Acuity   Emergent            Means of arrival   Walk-In    Escorted by   Self    Service   Hospitalist    Admission type   Emergency            Arrival complaint   Dizziness/Multiple Falls           Chief Complaint   Patient presents with   • Syncope     Patient reports having periods of dizziness, "rocking on a boat sensation", off balance, near syncope x2  Reports fall and lose of balance this AM, no injury/headstrike  (+) ASA  Initial Presentation: 43 y o  male with no PMH who presents with complaints of intermittent episodes of dizziness that he described as “see-sawing sensation” that started 2 days ago  He reports initial episode of dizziness when working as he bent down onto the floor  He states that he went and sat in his truck for 1 hour to alleviate symptoms  Patient endorses associated symptoms of nausea without vomiting  Patient reports dizziness is worse with ambulation  Plan: Observation for dizziness: trend troponin, stroke pathway, neuro checks, telemetry, ASA and statin, Echo, Neurology consult, PT/OT         ED Triage Vitals   Temperature Pulse Respirations Blood Pressure SpO2   11/05/22 1631 11/05/22 1631 11/05/22 1631 11/05/22 1631 11/05/22 1631   98 5 °F (36 9 °C) 76 16 170/90 100 %      Temp Source Heart Rate Source Patient Position - Orthostatic VS BP Location FiO2 (%)   11/05/22 1631 11/05/22 1631 11/05/22 2318 11/05/22 1631 --   Oral Monitor Lying Right arm       Pain Score       11/05/22 1933       No Pain          Wt Readings from Last 1 Encounters:   11/06/22 79 4 kg (175 lb)     Additional Vital Signs:     Date/Time Temp Pulse Resp BP MAP (mmHg) SpO2 O2 Device Patient Position - Orthostatic VS   11/06/22 07:38:16 97 9 °F (36 6 °C) 70 15 118/76 90 97 % -- --   11/06/22 0600 98 7 °F (37 1 °C) 71 18 125/76 92 98 % None (Room air) Lying   11/06/22 0557 -- 71 -- 125/76 92 98 % -- --   11/06/22 05:56:46 -- 71 -- 125/76 92 98 % -- --   11/06/22 0400 98 6 °F (37 °C) 70 18 124/74 91 98 % None (Room air) Lying   11/06/22 03:48:05 -- 67 -- 124/74 91 98 % -- --   11/06/22 0200 98 6 °F (37 °C) 75 18 125/75 91 95 % None (Room air) Lying   11/06/22 01:46:22 -- 73 -- 127/73 91 95 % -- --   11/06/22 01:46:06 -- 73 -- 127/73 91 96 % -- --   11/06/22 0101 98 6 °F (37 °C) 73 16 127/73 91 95 % None (Room air) Lying   11/06/22 0100 98 6 °F (37 °C)  69 16 127/74 92 97 % None (Room air) Lying   Temp: x 2 for daylight savins time at 11/06/22 0100   11/06/22 00:53:40 -- 69 -- 127/74 92 97 % -- --   11/06/22 00:53:19 -- 72 -- 127/74 92 98 % -- --   11/06/22 00:12:39 98 4 °F (36 9 °C) 72 16 137/77 97 97 % -- --   11/06/22 0000 98 4 °F (36 9 °C) 72 16 137/77 97 97 % None (Room air) Standing   11/05/22 2323 -- 82 16 124/85 -- -- -- Standing for 3 minutes - Orthostatic VS   11/05/22 2321 -- 83 16 124/81 -- -- -- Standing - Orthostatic VS   11/05/22 2320 -- 81 16 127/72 -- -- -- Sitting - Orthostatic VS   11/05/22 2319 -- 72 16 125/66 -- -- -- Lying - Orthostatic VS   11/05/22 2318 -- 72 16 125/66 88 99 % None (Room air) Lying   11/05/22 2300 -- 72 16 124/85 -- 99 % None (Room air) --   11/05/22 1933 -- 66 16 141/88 110 99 % None (Room air) --       Pertinent Labs/Diagnostic Test Results:   CTA head and neck with and without contrast   Final Result by Luz Moreno MD (11/05 1947)   1  CT brain:  No acute intracranial abnormality   2  CTA head: Negative for large vessel intracranial occlusion or hemodynamically significant stenosis    Focal tortuosity of the left cavernous ICA segment likely accounting for the lateral protuberance  3  CTA neck:  No extracranial carotid stenosis  The cervical vertebral arteries are patent  Workstation performed: [id number]         XR chest 1 view portable   ED Interpretation by Austyn Hoskins MD (11/05 1751)   No evidence of cardiopulmonary pathology          Results from last 7 days   Lab Units 11/05/22 2135   SARS-COV-2  Negative     Results from last 7 days   Lab Units 11/06/22 0418 11/05/22  1737   WBC Thousand/uL 6 64 7 32   HEMOGLOBIN g/dL 15 3 14 8   HEMATOCRIT % 45 5 43 8   PLATELETS Thousands/uL 221 234   NEUTROS ABS Thousands/µL  --  3 71         Results from last 7 days   Lab Units 11/06/22 0418 11/05/22  1737   SODIUM mmol/L 139 140   POTASSIUM mmol/L 4 1 3 9   CHLORIDE mmol/L 107 105   CO2 mmol/L 24 28   ANION GAP mmol/L 8 7   BUN mg/dL 15 16   CREATININE mg/dL 0 79 0 84   EGFR ml/min/1 73sq m 110 107   CALCIUM mg/dL 9 5 9 1     Results from last 7 days   Lab Units 11/05/22  1737   AST U/L 12*   ALT U/L 14   ALK PHOS U/L 80   TOTAL PROTEIN g/dL 6 7   ALBUMIN g/dL 4 2   TOTAL BILIRUBIN mg/dL 0 28         Results from last 7 days   Lab Units 11/06/22 0418 11/05/22  1737   GLUCOSE RANDOM mg/dL 146* 89         Results from last 7 days   Lab Units 11/05/22 2135 11/05/22 1957 11/05/22  1737   HS TNI 0HR ng/L  --   --  2   HS TNI 2HR ng/L  --  2  --    HSTNI D2 ng/L  --  0  --    HS TNI 4HR ng/L 2  --   --    HSTNI D4 ng/L 0  --   --          Results from last 7 days   Lab Units 11/05/22  1737   PROTIME seconds 11 6   INR  0 83*   PTT seconds 33     Results from last 7 days   Lab Units 11/05/22  1737   TSH 3RD GENERATON uIU/mL 0 953         Results from last 7 days   Lab Units 11/05/22 2135   INFLUENZA A PCR  Negative   INFLUENZA B PCR  Negative   RSV PCR  Negative         ED Treatment:   Medication Administration from 11/05/2022 1626 to 11/06/2022 0006       Date/Time Order Dose Route Action     11/05/2022 1736 meclizine (ANTIVERT) tablet 25 mg 25 mg Oral Given     11/05/2022 1916 iohexol (OMNIPAQUE) 350 MG/ML injection (SINGLE-DOSE) 85 mL 85 mL Intravenous Given     11/05/2022 1928 methylPREDNISolone sodium succinate (Solu-MEDROL) injection 125 mg 125 mg Intravenous Given     11/05/2022 1927 diphenhydrAMINE (BENADRYL) injection 50 mg 50 mg Intravenous Given     11/05/2022 2345 nicotine (NICODERM CQ) 21 mg/24 hr TD 24 hr patch 1 patch 1 patch Transdermal Medication Applied     11/05/2022 2344 aspirin chewable tablet 81 mg 81 mg Oral Given        History reviewed  No pertinent past medical history  Present on Admission:  **None**      Admitting Diagnosis: Dizziness [R42]  Vertigo [R42]  Age/Sex: 43 y o  male  Admission Orders:  Scheduled Medications:  aspirin, 81 mg, Oral, Daily  atorvastatin, 40 mg, Oral, Daily With Dinner  nicotine, 1 patch, Transdermal, Daily      Continuous IV Infusions:     PRN Meds:  acetaminophen, 650 mg, Oral, Q6H PRN  meclizine, 25 mg, Oral, Q8H PRN  ondansetron, 4 mg, Intravenous, Q6H PRN        IP CONSULT TO NEUROLOGY  IP CONSULT TO CASE MANAGEMENT  IP CONSULT TO NUTRITION SERVICES    Network Utilization Review Department  ATTENTION: Please call with any questions or concerns to [phone] and carefully listen to the prompts so that you are directed to the right person  All voicemails are confidential   Frances Appiah all requests for admission clinical reviews, approved or denied determinations and any other requests to dedicated fax number below belonging to the campus where the patient is receiving treatment   List of dedicated fax numbers for the Facilities:  [address] DENIALS (Administrative/Medical Necessity) [phone]   [address] [place] [address] (Maternity/NICU/Pediatrics) [phone]   [address]370 [facility] [address] [place] [address] Fredrick Gutierrez [address] [facility] [place] [address] 815 [facility] [phone]

## 2022-11-06 NOTE — SPEECH THERAPY NOTE
Speech Language/Pathology  Speech/Language Pathology  Assessment    Patient Name: Tracee Slade  TLEJX'S Date: 11/6/2022     Problem List  Principal Problem:    Dizziness  Active Problems:    Allergic reaction to contrast dye    Recent COVID-19    Nicotine dependence    Past Medical History  History reviewed  No pertinent past medical history  Past Surgical History  History reviewed  No pertinent surgical history  Tracee Slade is a 43 y o  male with a PMH of tobacco dependence who presents with complaints of intermittent episodes of dizziness that he described as “see-sawing sensation” that started 2 days ago  He reports initial episode of dizziness when working as he bent down onto the floor  He states that he went and sat in his truck for 1 hour to alleviate symptoms  Patient endorses associated symptoms of nausea without vomiting  Patient reports dizziness is worse with ambulation  He denies headache, numbness, tingling, word-finding difficulty or visual changes  Patient endorses history of migraines     Patient will be admitted for CVA pathway including telemetry and neurology consult  Consult received for speech/swallow eval on stroke pathway  Pt passed nsg swallow screen; tolerating regular diet w/o s/s dysphagia or aspiration  No speech/language deficits reported  NIH score is 0  CTA head & neck w/o contrast: 11/5/22 1  CT brain:  No acute intracranial abnormality  2  CTA head: Negative for large vessel intracranial occlusion or hemodynamically significant stenosis  Focal tortuosity of the left cavernous ICA segment likely accounting for the lateral protuberance  3  CTA neck:  No extracranial carotid stenosis  The cervical vertebral arteries are patent  No need for formal speech/swallow eval at this time  Reconsult if needed      Dacia Adams CCC-SLP  Speech Pathologist  Available via  tiger text

## 2022-11-06 NOTE — PLAN OF CARE
Problem: PAIN - ADULT  Goal: Verbalizes/displays adequate comfort level or baseline comfort level  Description: Interventions:  - Encourage patient to monitor pain and request assistance  - Assess pain using appropriate pain scale  - Administer analgesics based on type and severity of pain and evaluate response  - Implement non-pharmacological measures as appropriate and evaluate response  - Consider cultural and social influences on pain and pain management  - Notify physician/advanced practitioner if interventions unsuccessful or patient reports new pain  11/6/2022 1259 by Jennifer Fidel  Outcome: Adequate for Discharge  11/6/2022 1256 by Jennifer Fidel  Outcome: Progressing  11/6/2022 0945 by Jennifer Fidel  Outcome: Progressing     Problem: INFECTION - ADULT  Goal: Absence or prevention of progression during hospitalization  Description: INTERVENTIONS:  - Assess and monitor for signs and symptoms of infection  - Monitor lab/diagnostic results  - Monitor all insertion sites, i e  indwelling lines, tubes, and drains  - Monitor endotracheal if appropriate and nasal secretions for changes in amount and color  - [place] appropriate cooling/warming therapies per order  - Administer medications as ordered  - Instruct and encourage patient and family to use good hand hygiene technique  - Identify and instruct in appropriate isolation precautions for identified infection/condition  11/6/2022 1259 by Jennifer Fidel  Outcome: Adequate for Discharge  11/6/2022 1256 by Jennifer Fidel  Outcome: Progressing  11/6/2022 0945 by Jennifer Fidel  Outcome: Progressing  Goal: Absence of fever/infection during neutropenic period  Description: INTERVENTIONS:  - Monitor WBC    11/6/2022 1259 by Jennifer Fidel  Outcome: Adequate for Discharge  11/6/2022 1256 by Jennifer Koeniger  Outcome: Progressing  11/6/2022 0945 by Jennifer Fidel  Outcome: Progressing     Problem: SAFETY ADULT  Goal: Patient will remain free of falls  Description: INTERVENTIONS:  - Educate patient/family on patient safety including physical limitations  - Instruct patient to call for assistance with activity   - Consult OT/PT to assist with strengthening/mobility   - Keep Call bell within reach  - Keep bed low and locked with side rails adjusted as appropriate  - Keep care items and personal belongings within reach  - Initiate and maintain comfort rounds  - Make Fall Risk Sign visible to staff  - Offer Toileting every 2 Hours, in advance of need  - Initiate/Maintain alarm  - Obtain necessary fall risk management equipment:   - Apply yellow socks and bracelet for high fall risk patients  - Consider moving patient to room near nurses station  11/6/2022 1259 by Sandi Majano  Outcome: Adequate for Discharge  11/6/2022 1256 by Sandi Majano  Outcome: Progressing  11/6/2022 0945 by Sandi Majano  Outcome: Progressing  Goal: Maintain or return to baseline ADL function  Description: INTERVENTIONS:  -  Assess patient's ability to carry out ADLs; assess patient's baseline for ADL function and identify physical deficits which impact ability to perform ADLs (bathing, care of mouth/teeth, toileting, grooming, dressing, etc )  - Assess/evaluate cause of self-care deficits   - Assess range of motion  - Assess patient's mobility; develop plan if impaired  - Assess patient's need for assistive devices and provide as appropriate  - Encourage maximum independence but intervene and supervise when necessary  - Involve family in performance of ADLs  - Assess for home care needs following discharge   - Consider OT consult to assist with ADL evaluation and planning for discharge  - Provide patient education as appropriate  11/6/2022 1259 by Sandi Majano  Outcome: Adequate for Discharge  11/6/2022 1256 by Sandi Majano  Outcome: Progressing  11/6/2022 0945 by Sandi Majano  Outcome: Progressing  Goal: Maintains/Returns to pre admission functional level  Description: INTERVENTIONS:  - Perform BMAT or MOVE assessment daily  - Set and communicate daily mobility goal to care team and patient/family/caregiver  - Collaborate with rehabilitation services on mobility goals if consulted  - Perform Range of Motion 2 times a day  - Reposition patient every 2 hours  - Dangle patient 2 times a day  - Stand patient 2 times a day  - Ambulate patient 2 times a day  - Out of bed to chair 2 times a day   - Out of bed for meals 2 times a day  - Out of bed for toileting  - Record patient progress and toleration of activity level   11/6/2022 1259 by Aman Latham  Outcome: Adequate for Discharge  11/6/2022 1256 by Aman Latham  Outcome: Progressing  11/6/2022 0945 by Aman Latham  Outcome: Progressing     Problem: DISCHARGE PLANNING  Goal: Discharge to home or other facility with appropriate resources  Description: INTERVENTIONS:  - Identify barriers to discharge w/patient and caregiver  - Arrange for needed discharge resources and transportation as appropriate  - Identify discharge learning needs (meds, wound care, etc )  - Arrange for interpretive services to assist at discharge as needed  - Refer to Case Management Department for coordinating discharge planning if the patient needs post-hospital services based on physician/advanced practitioner order or complex needs related to functional status, cognitive ability, or social support system  11/6/2022 1259 by Aman Latham  Outcome: Adequate for Discharge  11/6/2022 1256 by Aman Latham  Outcome: Progressing  11/6/2022 0945 by Aman Latham  Outcome: Progressing     Problem: Knowledge Deficit  Goal: Patient/family/caregiver demonstrates understanding of disease process, treatment plan, medications, and discharge instructions  Description: Complete learning assessment and assess knowledge base    Interventions:  - Provide teaching at level of understanding  - Provide teaching via preferred learning methods  11/6/2022 1259 by Aman Latham  Outcome: Adequate for Discharge  11/6/2022 1256 by Tracey Delgado Gladis  Outcome: Progressing  11/6/2022 0945 by Zahra Restrepo  Outcome: Progressing

## 2022-11-06 NOTE — DISCHARGE SUMMARY
[facility]  Discharge- [facility] 1980, 43 y o  male MRN: [id number]  Unit/Bed#: S -01 Encounter: [id number]  Primary Care Provider: No primary care provider on file  Date and time admitted to hospital: 11/5/2022  4:33 PM    Nicotine dependence  Assessment & Plan  · Patient reports smoking 1 5 PPD  · Encouraged smoking cessation  · Nicotine patch  Recent COVID-19  Assessment & Plan  · Patient reports that he was positive for COVID-19 one month ago and recovered at home  · No isolation needed  Allergic reaction to contrast dye  Assessment & Plan  · Per ED provider, patient developed itchiness, hives and sensation of choking post contrast for CTA head/neck  · Received IV Benadryl and IV Solu-Medrol  · Patient denies any complaints at this time and resolution of symptoms  · Contrast allergy added to allergies on chart  * Dizziness-resolved as of 11/6/2022  Assessment & Plan  · Presentation:  Patient presents with complaints of intermittent episodes of dizziness that he described as “see-sawing sensation” that started 2 days ago  He reports initial episode of dizziness when working as he bent down onto the floor  He states that he went and sat in his truck for 1 hour to alleviate symptoms  Patient endorses associated symptoms of nausea without vomiting  Patient reports dizziness is worse with ambulation  He denies headache, numbness, tingling, word-finding difficulty or visual changes  Patient endorses history of migraines  · Etiology: BPPV versus TIA/CVA versus Cardiac arrhythmia   · CT head:  "No acute intracranial abnormality "  · CTA head: "Negative for large vessel intracranial occlusion or hemodynamically significant stenosis  Focal tortuosity of the left cavernous ICA segment likely accounting for the lateral protuberance "   · CTA neck:  "No extracranial carotid stenosis    The cervical vertebral arteries are patent "  · Obtain orthostatic blood pressure readings  · EKG displayed NSR, heart rate 76  No acute ischemic changes  · Troponin 2 x3  ·    Discussed with Neurology  Likely BP PV no reason for MRI  Echocardiogram done results pending      Discharging Physician / Practitioner: Samira Villalba  PCP: No primary care provider on file  Admission Date:   Admission Orders (From admission, onward)     Ordered        11/05/22 2049  Place in Observation  Once                      Discharge Date: 11/06/22    Medical Problems             Resolved Problems  Date Reviewed: 11/6/2022          Resolved    * (Principal) Dizziness 11/6/2022     Resolved by  Samira Villalba MD                Consultations During Hospital Stay:  · Neurology    Procedures Performed:   · CTA which is unremarkable    Significant Findings / Test Results:   · None    Incidental Findings:   · None     Test Results Pending at Discharge (will require follow up): · None     Outpatient Tests Requested:  · None    Complications:  None    Reason for Admission:  Dizziness    Hospital Course:     Dileep Baca is a 43 y o  male patient who originally presented to the hospital on 11/5/2022 due to dizziness  History presenting illness:Sang Nickerson is a 43 y o  male with a PMH of tobacco dependence who presents with complaints of intermittent episodes of dizziness that he described as “see-sawing sensation” that started 2 days ago  He reports initial episode of dizziness when working as he bent down onto the floor  He states that he went and sat in his truck for 1 hour to alleviate symptoms  Patient endorses associated symptoms of nausea without vomiting  Patient reports dizziness is worse with ambulation  He denies headache, numbness, tingling, word-finding difficulty or visual changes  Patient endorses history of migraines  Hospital course:  Patient was admitted for above reasons     Seen by Neurology  Unlikely stroke no need for MRI  Thinks it is more BPPV    Okay for discharge  Echocardiogram done results are pending on discharge but the patient could follow-up as an outpatient since suspicion is low        Please see above list of diagnoses and related plan for additional information  Condition at Discharge: good     Discharge Day Visit / Exam:     Subjective:  Patient seen examined  States he feels okay today  No complaints  Vitals: Blood Pressure: 127/89 (11/06/22 1128)  Pulse: 84 (11/06/22 1128)  Temperature: 97 9 °F (36 6 °C) (11/06/22 0738)  Temp Source: Oral (11/06/22 0600)  Respirations: 16 (11/06/22 1128)  Height: 5' 10" (177 8 cm) (11/06/22 8065)  Weight - Scale: 79 4 kg (175 lb) (11/06/22 0937)  SpO2: 97 % (11/06/22 1128)  Exam:   Physical Exam  (   General Appearance:    Alert, cooperative, no distress, appears stated age                               Lungs:     Clear to auscultation bilaterally, respirations unlabored       Heart:    Regular rate and rhythm, S1 and S2 normal, no murmur, rub    or gallop   Abdomen:     Soft, non-tender, bowel sounds active all four quadrants,     no masses, no organomegaly           Extremities:   Extremities normal, atraumatic, no cyanosis or edema         Discharge instructions/Information to patient and family:   See after visit summary for information provided to patient and family  Provisions for Follow-Up Care:  See after visit summary for information related to follow-up care and any pertinent home health orders  Disposition:     Home    For Discharges to [facility] SNF:   · Not Applicable to this Patient - Not Applicable to this Patient    Planned Readmission:  Not anticipated     Discharge Statement:  I spent 25 minutes discharging the patient  This time was spent on the day of discharge  I had direct contact with the patient on the day of discharge   Greater than 50% of the total time was spent examining patient, answering all patient questions, arranging and discussing plan of care with patient as well as directly providing post-discharge instructions  Additional time then spent on discharge activities  Discharge Medications:  See after visit summary for reconciled discharge medications provided to patient and family        ** Please Note: This note has been constructed using a voice recognition system **

## 2022-11-06 NOTE — ASSESSMENT & PLAN NOTE
· Presentation:  Patient presents with complaints of intermittent episodes of dizziness that he described as “see-sawing sensation” that started 2 days ago  He reports initial episode of dizziness when working as he bent down onto the floor  He states that he went and sat in his truck for 1 hour to alleviate symptoms  Patient endorses associated symptoms of nausea without vomiting  Patient reports dizziness is worse with ambulation  He denies headache, numbness, tingling, word-finding difficulty or visual changes  Patient endorses history of migraines  · Etiology: BPPV versus TIA/CVA versus Cardiac arrhythmia   · CT head:  "No acute intracranial abnormality "  · CTA head: "Negative for large vessel intracranial occlusion or hemodynamically significant stenosis  Focal tortuosity of the left cavernous ICA segment likely accounting for the lateral protuberance "   · CTA neck:  "No extracranial carotid stenosis  The cervical vertebral arteries are patent "  · Obtain orthostatic blood pressure readings  · EKG displayed NSR, heart rate 76  No acute ischemic changes  · Troponin 2 x3  ·    Discussed with Neurology  Likely BP PV no reason for MRI    Echocardiogram done results pending

## 2022-11-06 NOTE — ASSESSMENT & PLAN NOTE
· Patient reports that he was positive for COVID-19 one month ago and recovered at home  · No isolation needed

## 2022-11-06 NOTE — PLAN OF CARE
Problem: PAIN - ADULT  Goal: Verbalizes/displays adequate comfort level or baseline comfort level  Description: Interventions:  - Encourage patient to monitor pain and request assistance  - Assess pain using appropriate pain scale  - Administer analgesics based on type and severity of pain and evaluate response  - Implement non-pharmacological measures as appropriate and evaluate response  - Consider cultural and social influences on pain and pain management  - Notify physician/advanced practitioner if interventions unsuccessful or patient reports new pain  11/6/2022 1256 by Jovanna Valladares  Outcome: Progressing  11/6/2022 0945 by Jovanna Valladares  Outcome: Progressing     Problem: INFECTION - ADULT  Goal: Absence or prevention of progression during hospitalization  Description: INTERVENTIONS:  - Assess and monitor for signs and symptoms of infection  - Monitor lab/diagnostic results  - Monitor all insertion sites, i e  indwelling lines, tubes, and drains  - Monitor endotracheal if appropriate and nasal secretions for changes in amount and color  - Luana appropriate cooling/warming therapies per order  - Administer medications as ordered  - Instruct and encourage patient and family to use good hand hygiene technique  - Identify and instruct in appropriate isolation precautions for identified infection/condition  11/6/2022 1256 by Jovanna Valladares  Outcome: Progressing  11/6/2022 0945 by Jovanna Valladares  Outcome: Progressing  Goal: Absence of fever/infection during neutropenic period  Description: INTERVENTIONS:  - Monitor WBC    11/6/2022 1256 by Jovanna Valladares  Outcome: Progressing  11/6/2022 0945 by Jovanna Valladares  Outcome: Progressing     Problem: SAFETY ADULT  Goal: Patient will remain free of falls  Description: INTERVENTIONS:  - Educate patient/family on patient safety including physical limitations  - Instruct patient to call for assistance with activity   - Consult OT/PT to assist with strengthening/mobility   - Keep Call bell within reach  - Keep bed low and locked with side rails adjusted as appropriate  - Keep care items and personal belongings within reach  - Initiate and maintain comfort rounds  - Make Fall Risk Sign visible to staff  - Offer Toileting every 2 Hours, in advance of need  - Initiate/Maintain alarm  - Obtain necessary fall risk management equipment:   - Apply yellow socks and bracelet for high fall risk patients  - Consider moving patient to room near nurses station  11/6/2022 1256 by [facility] Patient  Outcome: Progressing  11/6/2022 0945 by [facility] Patient  Outcome: Progressing  Goal: Maintain or return to baseline ADL function  Description: INTERVENTIONS:  -  Assess patient's ability to carry out ADLs; assess patient's baseline for ADL function and identify physical deficits which impact ability to perform ADLs (bathing, care of mouth/teeth, toileting, grooming, dressing, etc )  - Assess/evaluate cause of self-care deficits   - Assess range of motion  - Assess patient's mobility; develop plan if impaired  - Assess patient's need for assistive devices and provide as appropriate  - Encourage maximum independence but intervene and supervise when necessary  - Involve family in performance of ADLs  - Assess for home care needs following discharge   - Consider OT consult to assist with ADL evaluation and planning for discharge  - Provide patient education as appropriate  11/6/2022 1256 by [facility] Patient  Outcome: Progressing  11/6/2022 0945 by [facility] Patient  Outcome: Progressing  Goal: Maintains/Returns to pre admission functional level  Description: INTERVENTIONS:  - Perform BMAT or MOVE assessment daily    - Set and communicate daily mobility goal to care team and patient/family/caregiver  - Collaborate with rehabilitation services on mobility goals if consulted  - Perform Range of Motion 2 times a day  - Reposition patient every 2 hours    - Dangle patient 2 times a day  - Stand patient 2 times a day  - Ambulate patient 2 times a day  - Out of bed to chair 2 times a day   - Out of bed for meals 2 times a day  - Out of bed for toileting  - Record patient progress and toleration of activity level   11/6/2022 1256 by Nirmal Severino  Outcome: Progressing  11/6/2022 0945 by Nirmal Severino  Outcome: Progressing     Problem: DISCHARGE PLANNING  Goal: Discharge to home or other facility with appropriate resources  Description: INTERVENTIONS:  - Identify barriers to discharge w/patient and caregiver  - Arrange for needed discharge resources and transportation as appropriate  - Identify discharge learning needs (meds, wound care, etc )  - Arrange for interpretive services to assist at discharge as needed  - Refer to Case Management Department for coordinating discharge planning if the patient needs post-hospital services based on physician/advanced practitioner order or complex needs related to functional status, cognitive ability, or social support system  11/6/2022 1256 by Nirmal Severino  Outcome: Progressing  11/6/2022 0945 by Nirmal Severino  Outcome: Progressing     Problem: Knowledge Deficit  Goal: Patient/family/caregiver demonstrates understanding of disease process, treatment plan, medications, and discharge instructions  Description: Complete learning assessment and assess knowledge base    Interventions:  - Provide teaching at level of understanding  - Provide teaching via preferred learning methods  11/6/2022 1256 by Nirmal Severino  Outcome: Progressing  11/6/2022 0945 by Nirmal Severino  Outcome: Progressing

## 2022-11-06 NOTE — PHYSICAL THERAPY NOTE
PHYSICAL THERAPY SCREEN NOTE    Patient Name: Enrique Walters  TNPHZ'Y Date: 11/6/2022 11/06/22 1000   PT Last Visit   PT Visit Date 11/06/22   Note Type   Note type Screen   Additional Comments PT orders received, chart reivew performed  Spoke to [employer], pt is independently ambulating around room and reports no concerns w/ mobility upon DC  Pt w/ no acute PT needs, will DC from [facility]   Please reconsult if needs arise     Carine Mcclain, PT, DPT

## 2022-11-06 NOTE — CONSULTS
Consultation - Neurology   David Gomez 43 y o  male MRN: [id number]  Unit/Bed#: S -01 Encounter: [id number]      Physician Requesting Consult: Umesh Alvares MD  Inpatient consult to Neurology  Consult performed by: Nikhil Kay MD  Consult ordered by: Ryann Meyers        Reason for Consult / Principal Problem: dizziness    Assessment:  Probable BPPV  Unlikely acute cva given symptoms being intermittent over the last couple days  Also other than tobacco dependence, no other vascular risk factors  No occlusion or significant atherosclerosis per review of cta head/neck  Tortuosity of the left cavernous ICA segment likely asymptomatic  No lateralizing findings on exam  No recent illness  No new meds  No headache  No evidence of dehydration  Unlikely cardiac given worsening of dizziness with movement  Overall pt is clinically better  No need for brain MRI  Plan:  Outpt vestibular rehab to learn Epley's maneuvers   mecalizine 25 mg po q8 hr prn for home, give a few day supply  zofran 4 mg po q6 hr prn for home, give a few day supply  No need for outpt neurological follow up  He is stable for discharge to home from neurologic standpoint  HPI: David Gomez is a 43 y o  male who was admitted to [address] [place] [address] yesterday after complaining of intermittent episodes of dizziness that began three days ago on 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  He developed nasuea as well  He went to rest in his truck for about an hour, not much improvement  Never happened before  Rocking sensation he states  On Saturday he felt twice due to the rocking, didn't feel like he was being pulled in any particular direction  Hx of migraines but not during this current period  ROS:  Per 12 point review gait imbalance, nausea, hx of headaches, light headedness, dizziness, rest negative        Historical Information   History reviewed   No pertinent past medical history  History reviewed  No pertinent surgical history  Social History   Social History     Tobacco Use   Smoking Status Current Every Day Smoker   • Packs/day: 1 00   Smokeless Tobacco Never Used     Social History     Substance and Sexual Activity   Alcohol Use Yes    Comment: rare     Social History     Substance and Sexual Activity   Drug Use Never       Family History: non-contributory      Meds/Allergies     Allergies   Allergen Reactions   • Contrast [Iodinated Diagnostic Agents] Hives, Itching and Other (See Comments)     Choking sensation       all current active meds have been reviewed    Objective   Vitals:Blood pressure 118/76, pulse 70, temperature 97 9 °F (36 6 °C), resp  rate 15, height 5' 10" (1 778 m), weight 79 4 kg (175 lb), SpO2 97 %  ,Body mass index is 25 11 kg/m²  Physical Exam:   Physical Exam General appearance: alert, appears stated age and cooperative  Head: Normocephalic, without obvious abnormality, atraumatic    Extremities: extremities normal, atraumatic, no cyanosis or edema    Neurologic:  Cognitive:  Mental status: Alert, orientedX3, thought content appropriate  CN: CNII-XII normal  No nystagume  Motor: normal tone and bulk  5 power ue/le bilat  Sensory: intact  X 4 limbs 4 mod inc vib and prop, not PP  Cerebellar: no ataxia Fine motor wnl, Finger taps WNL  No past pointing  DTR's: WNL   Plantars: downgoing bilat  Gait: able to walk straight, pivot to right and left without difficulty  Lab Results: I have personally reviewed pertinent reports        Imaging Studies: I have personally reviewed pertinent films in PACS    EKG, Pathology, and Other Studies: I have personally reviewed pertinent films in PACS

## 2022-11-06 NOTE — ASSESSMENT & PLAN NOTE
· Per ED provider, patient developed itchiness, hives and sensation of choking post contrast for CTA head/neck  · Received IV Benadryl and IV Solu-Medrol  · Patient denies any complaints at this time and resolution of symptoms  · Contrast allergy added to allergies on chart

## 2022-11-06 NOTE — H&P
[facility]  H&P- Mitra Ohara 1980, 43 y o  male MRN: [id number]  Unit/Bed#: ED-22 Encounter: [id number]  Primary Care Provider: No primary care provider on file  Date and time admitted to hospital: 11/5/2022  4:33 PM    * Dizziness  Assessment & Plan  · Presentation:  Patient presents with complaints of intermittent episodes of dizziness that he described as “see-sawing sensation” that started 2 days ago  He reports initial episode of dizziness when working as he bent down onto the floor  He states that he went and sat in his truck for 1 hour to alleviate symptoms  Patient endorses associated symptoms of nausea without vomiting  Patient reports dizziness is worse with ambulation  He denies headache, numbness, tingling, word-finding difficulty or visual changes  Patient endorses history of migraines  · Etiology: BPPV versus TIA/CVA versus Cardiac arrhythmia   · CT head:  "No acute intracranial abnormality "  · CTA head: "Negative for large vessel intracranial occlusion or hemodynamically significant stenosis  Focal tortuosity of the left cavernous ICA segment likely accounting for the lateral protuberance "   · CTA neck:  "No extracranial carotid stenosis  The cervical vertebral arteries are patent "  · Obtain orthostatic blood pressure readings  · EKG displayed NSR, heart rate 76  No acute ischemic changes  · Troponin 2 x3  Stroke Pathway  Frequent vital signs  Neuro checks  Telemetry  Check lipid panel and A1c  Aspirin and statin  Defer MRI brain to Neurology  Obtain ECHO  Meclizine prn  · PT/OT consultation  · Neurology consult  Allergic reaction to contrast dye  Assessment & Plan  · Per ED provider, patient developed itchiness, hives and sensation of choking post contrast for CTA head/neck  · Received IV Benadryl and IV Solu-Medrol  · Patient denies any complaints at this time and resolution of symptoms    · Contrast allergy added to allergies on chart     Nicotine dependence  Assessment & Plan  · Patient reports smoking 1 5 PPD  · Encouraged smoking cessation  · Nicotine patch  Recent COVID-19  Assessment & Plan  · Patient reports that he was positive for COVID-19 one month ago and recovered at home  · No isolation needed  VTE Pharmacologic Prophylaxis: VTE Score: 2 Low Risk (Score 0-2) - Encourage Ambulation  Code Status: Level 1 - Full Code   Discussion with family: Updated  (wife) at bedside  Anticipated Length of Stay: Patient will be admitted on an observation basis with an anticipated length of stay of less than 2 midnights secondary to dizziness work up  Total Time for Visit, including Counseling / Coordination of Care: 70 minutes Greater than 50% of this total time spent on direct patient counseling and coordination of care  Chief Complaint: Dizziness    History of Present Illness:  Fatimah Thao is a 43 y o  male with a PMH of tobacco dependence who presents with complaints of intermittent episodes of dizziness that he described as “see-sawing sensation” that started 2 days ago  He reports initial episode of dizziness when working as he bent down onto the floor  He states that he went and sat in his truck for 1 hour to alleviate symptoms  Patient endorses associated symptoms of nausea without vomiting  Patient reports dizziness is worse with ambulation  He denies headache, numbness, tingling, word-finding difficulty or visual changes  Patient endorses history of migraines     Patient will be admitted for CVA pathway including telemetry and neurology consult  Review of Systems:  Review of Systems   Constitutional: Negative for chills and fever  Respiratory: Negative for chest tightness and shortness of breath (Occasionally)  Cardiovascular: Negative for chest pain and palpitations  Gastrointestinal: Positive for nausea  Negative for abdominal pain and vomiting     Musculoskeletal: Positive for gait problem  Negative for arthralgias  Neurological: Positive for dizziness and light-headedness  Negative for syncope, weakness, numbness and headaches  All other systems reviewed and are negative  Past Medical and Surgical History:   History reviewed  No pertinent past medical history  History reviewed  No pertinent surgical history  Meds/Allergies:  Prior to Admission medications    Not on File     Patient does not take any medication on a daily basis  Allergies: Allergies   Allergen Reactions   • Contrast [Iodinated Diagnostic Agents] Hives, Itching and Other (See Comments)     Choking sensation       Social History:  Marital Status: /Civil Union   Occupation:   Patient Pre-hospital Living Situation: Home, With spouse  Patient Pre-hospital Level of Mobility: walks  Patient Pre-hospital Diet Restrictions: None  Substance Use History:   Social History     Substance and Sexual Activity   Alcohol Use Yes    Comment: rare     Social History     Tobacco Use   Smoking Status Current Every Day Smoker   • Packs/day: 1 00   Smokeless Tobacco Never Used     Social History     Substance and Sexual Activity   Drug Use Never       Family History:  History reviewed  No pertinent family history  Physical Exam:     Vitals:   Blood Pressure: 141/88 (11/05/22 1933)  Pulse: 66 (11/05/22 1933)  Temperature: 98 5 °F (36 9 °C) (11/05/22 1631)  Temp Source: Oral (11/05/22 1631)  Respirations: 16 (11/05/22 1933)  Height: 5' 10" (177 8 cm) (11/05/22 1633)  Weight - Scale: 79 4 kg (175 lb) (11/05/22 1633)  SpO2: 99 % (11/05/22 1933)    Physical Exam  Vitals and nursing note reviewed  Constitutional:       General: He is not in acute distress  Appearance: He is normal weight  He is not ill-appearing or diaphoretic  HENT:      Head: Normocephalic  Nose: Nose normal       Mouth/Throat:      Pharynx: Oropharynx is clear  Eyes:      General: No scleral icterus  Conjunctiva/sclera: Conjunctivae normal    Cardiovascular:      Rate and Rhythm: Normal rate and regular rhythm  Pulses: Normal pulses  Heart sounds: Normal heart sounds  No murmur heard  Pulmonary:      Effort: Pulmonary effort is normal  No respiratory distress  Breath sounds: Normal breath sounds  No wheezing, rhonchi or rales  Chest:      Chest wall: No tenderness  Abdominal:      General: Bowel sounds are normal  There is no distension  Palpations: Abdomen is soft  Tenderness: There is no abdominal tenderness  Musculoskeletal:         General: No swelling or deformity  Normal range of motion  Cervical back: Normal range of motion  Skin:     General: Skin is warm and dry  Neurological:      General: No focal deficit present  Mental Status: He is alert and oriented to person, place, and time  GCS: GCS eye subscore is 4  GCS verbal subscore is 5  GCS motor subscore is 6  Cranial Nerves: No dysarthria or facial asymmetry  Motor: No weakness  Additional Data:     Lab Results:  Results from last 7 days   Lab Units 11/05/22  1737   WBC Thousand/uL 7 32   HEMOGLOBIN g/dL 14 8   HEMATOCRIT % 43 8   PLATELETS Thousands/uL 234   NEUTROS PCT % 50   LYMPHS PCT % 30   MONOS PCT % 8   EOS PCT % 11*     Results from last 7 days   Lab Units 11/05/22  1737   SODIUM mmol/L 140   POTASSIUM mmol/L 3 9   CHLORIDE mmol/L 105   CO2 mmol/L 28   BUN mg/dL 16   CREATININE mg/dL 0 84   ANION GAP mmol/L 7   CALCIUM mg/dL 9 1   ALBUMIN g/dL 4 2   TOTAL BILIRUBIN mg/dL 0 28   ALK PHOS U/L 80   ALT U/L 14   AST U/L 12*   GLUCOSE RANDOM mg/dL 89     Results from last 7 days   Lab Units 11/05/22  1737   INR  0 83*                   Imaging: Reviewed radiology reports from this admission including: CT head  CTA head and neck with and without contrast   Final Result by Gerber Philip MD (11/05 1947)   1  CT brain:  No acute intracranial abnormality   2   CTA head: Negative for large vessel intracranial occlusion or hemodynamically significant stenosis  Focal tortuosity of the left cavernous ICA segment likely accounting for the lateral protuberance  3  CTA neck:  No extracranial carotid stenosis  The cervical vertebral arteries are patent  Workstation performed: [id number]         XR chest 1 view portable   ED Interpretation by Le Fajardo MD (11/05 1751)   No evidence of cardiopulmonary pathology  EKG and Other Studies Reviewed on Admission:   · EKG: NSR  HR 76     ** Please Note: This note has been constructed using a voice recognition system   **

## 2022-11-06 NOTE — ASSESSMENT & PLAN NOTE
· Presentation:  Patient presents with complaints of intermittent episodes of dizziness that he described as “see-sawing sensation” that started 2 days ago  He reports initial episode of dizziness when working as he bent down onto the floor  He states that he went and sat in his truck for 1 hour to alleviate symptoms  Patient endorses associated symptoms of nausea without vomiting  Patient reports dizziness is worse with ambulation  He denies headache, numbness, tingling, word-finding difficulty or visual changes  Patient endorses history of migraines  · Etiology: BPPV versus TIA/CVA versus Cardiac arrhythmia   · CT head:  "No acute intracranial abnormality "  · CTA head: "Negative for large vessel intracranial occlusion or hemodynamically significant stenosis  Focal tortuosity of the left cavernous ICA segment likely accounting for the lateral protuberance "   · CTA neck:  "No extracranial carotid stenosis  The cervical vertebral arteries are patent "  · Obtain orthostatic blood pressure readings  · EKG displayed NSR, heart rate 76  No acute ischemic changes  · Troponin 2 x3  Stroke Pathway  Frequent vital signs  Neuro checks  Telemetry  Check lipid panel and A1c  Aspirin and statin  Defer MRI brain to Neurology  Obtain ECHO  Meclizine prn  · PT/OT consultation  · Neurology consult